# Patient Record
Sex: MALE | Race: OTHER
[De-identification: names, ages, dates, MRNs, and addresses within clinical notes are randomized per-mention and may not be internally consistent; named-entity substitution may affect disease eponyms.]

---

## 2021-12-24 ENCOUNTER — HOSPITAL ENCOUNTER (EMERGENCY)
Dept: HOSPITAL 54 - ER | Age: 46
Discharge: HOME | End: 2021-12-24
Payer: COMMERCIAL

## 2021-12-24 VITALS — HEIGHT: 66 IN | WEIGHT: 180 LBS | BODY MASS INDEX: 28.93 KG/M2

## 2021-12-24 VITALS — DIASTOLIC BLOOD PRESSURE: 80 MMHG | SYSTOLIC BLOOD PRESSURE: 130 MMHG

## 2021-12-24 DIAGNOSIS — Z59.00: ICD-10-CM

## 2021-12-24 DIAGNOSIS — F31.9: ICD-10-CM

## 2021-12-24 DIAGNOSIS — F20.9: ICD-10-CM

## 2021-12-24 DIAGNOSIS — Z60.2: ICD-10-CM

## 2021-12-24 DIAGNOSIS — T69.9XXA: Primary | ICD-10-CM

## 2021-12-24 SDOH — ECONOMIC STABILITY - HOUSING INSECURITY: HOMELESSNESS UNSPECIFIED: Z59.00

## 2021-12-24 SDOH — SOCIAL STABILITY - SOCIAL INSECURITY: PROBLEMS RELATED TO LIVING ALONE: Z60.2

## 2021-12-24 NOTE — NUR
PT IS MEDICALLY CLEARED FOR DISCHARGE. PROVIDED W/ HOMELESS REFFERALS BUT 
REFUSED TO SIGN. INSISTING THAT HE NEEDS A BED AND A PLACE TO REST. STABLE 
VITALS.

## 2021-12-25 ENCOUNTER — HOSPITAL ENCOUNTER (EMERGENCY)
Dept: HOSPITAL 54 - ER | Age: 46
Discharge: HOME | End: 2021-12-25
Payer: MEDICAID

## 2021-12-25 VITALS — HEIGHT: 66 IN | BODY MASS INDEX: 22.5 KG/M2 | WEIGHT: 140 LBS

## 2021-12-25 VITALS — SYSTOLIC BLOOD PRESSURE: 121 MMHG | DIASTOLIC BLOOD PRESSURE: 78 MMHG

## 2021-12-25 DIAGNOSIS — F20.9: ICD-10-CM

## 2021-12-25 DIAGNOSIS — F31.9: ICD-10-CM

## 2021-12-25 DIAGNOSIS — K40.90: Primary | ICD-10-CM

## 2021-12-25 DIAGNOSIS — Z59.00: ICD-10-CM

## 2021-12-25 DIAGNOSIS — Z76.5: ICD-10-CM

## 2021-12-25 SDOH — ECONOMIC STABILITY - HOUSING INSECURITY: HOMELESSNESS UNSPECIFIED: Z59.00

## 2021-12-25 NOTE — NUR
PT BIBSELF C/O RT RIB PAIN AND LEFT INGUINAL HERNIA PAIN SINCE YESTERDAY. PT IS 
AAO X 4, NO SIGN OF ACUTE DISTRESS, BREATHING EVEN AND UNLABORED. PT SEEN AND 
EXAMINED BY DR VEGA. PT ATTACHED TO MONITOR AND PULSE OX. WILL CONTINUE TO 
MONITOR AND CARRY OUT MD ORDERS.

## 2021-12-25 NOTE — NUR
Patient discharged to home in stable condition. Written and verbal after care 
instructions given. Patient verbalizes understanding of instruction. Patient 
ambulatory with a steady gait

## 2021-12-27 ENCOUNTER — HOSPITAL ENCOUNTER (EMERGENCY)
Dept: HOSPITAL 54 - ER | Age: 46
Discharge: HOME | End: 2021-12-27
Payer: COMMERCIAL

## 2021-12-27 VITALS — DIASTOLIC BLOOD PRESSURE: 84 MMHG | SYSTOLIC BLOOD PRESSURE: 133 MMHG

## 2021-12-27 VITALS — HEIGHT: 66 IN | WEIGHT: 180 LBS | BODY MASS INDEX: 28.93 KG/M2

## 2021-12-27 DIAGNOSIS — F20.9: ICD-10-CM

## 2021-12-27 DIAGNOSIS — Z60.2: ICD-10-CM

## 2021-12-27 DIAGNOSIS — F31.9: ICD-10-CM

## 2021-12-27 DIAGNOSIS — K40.90: Primary | ICD-10-CM

## 2021-12-27 SDOH — SOCIAL STABILITY - SOCIAL INSECURITY: PROBLEMS RELATED TO LIVING ALONE: Z60.2

## 2021-12-27 NOTE — NUR
Patient discharged to home in stable condition. Written and verbal after care 
instructions given. Patient verbalizes understanding of instruction. PT 
ambulatory with a steady gait. HOMELESS WAIVER SIGNED